# Patient Record
(demographics unavailable — no encounter records)

---

## 2025-06-06 NOTE — HISTORY OF PRESENT ILLNESS
[FreeTextEntry1] : CPE [de-identified] : 38 yo  for [employer] Grew up [place] 9 [place] 9-18 [place] then [place]  Lives alone son visits week 2017 Cardio 3 days weekly [place] Diet:vegetables Tdap 2020 colon due colitis 1/27/2023  FH mat GM CAD very high cholesterol  colonoscopy  was told take suppository increase fiber  partners > 10 partners over 12 mos

## 2025-06-06 NOTE — HISTORY OF PRESENT ILLNESS
[FreeTextEntry1] : CPE [de-identified] : 38 yo  for [employer] company Grew up [place] 9 [place] 9-18 [place] then [place]  Lives alone son visits week 2017 Cardio 3 days weekly [place] Diet:vegetables Tdap 2020 colon due colitis 1/27/2023  FH mat GM CAD very high cholesterol  colonoscopy  was told take suppository increase fiber  partners > 10 partners over 12 mos